# Patient Record
Sex: MALE | Race: WHITE | NOT HISPANIC OR LATINO | Employment: FULL TIME | URBAN - METROPOLITAN AREA
[De-identification: names, ages, dates, MRNs, and addresses within clinical notes are randomized per-mention and may not be internally consistent; named-entity substitution may affect disease eponyms.]

---

## 2019-06-19 ENCOUNTER — TELEPHONE (OUTPATIENT)
Dept: TRANSPLANT | Facility: CLINIC | Age: 44
End: 2019-06-19

## 2019-06-19 DIAGNOSIS — Z00.5 TRANSPLANT DONOR EVALUATION: ICD-10-CM

## 2019-06-19 NOTE — TELEPHONE ENCOUNTER
"[facility] BREEZE  [id number]      LIVING KIDNEY DONOR EVALUATION  Donor First Name Stefan Donor N    Donor Last Name Miguel Completed 2019 9:15 PM    1975 Record ID [id number]   BREEZE Screen PASSED     Intended Recipient  Recipient First Name Elan Recipient MRN    Recipient Last Name Radames chappell Relationship Not related (Other)   Recipient  1959 Recipient Diagnosis    Recipient's ABO      Donor Information  Age 44 Gender Male   Ht 178 cm (5' 10'') Race    Wt 106.5 kg (235 lbs) Ethnicity Not /   BMI 33.70 kg/m  Preferred Language English      Required No     Blood Type O   Demographics  Home Address [address] [place] [address] # [phone]   [facility] Type [facility] #    [facility] Code [address] Type    Country [place] Preferred Contact day Mon   Email [email or web address] Preferred Contact time 1:00 PM-4:00 PM   &&   Donor's Medical Information  Medical History Low Back Pain   Positive TB Skin Test   Post-Traumatic Stress Disorder (PTSD) Medications None Reported   Surgical History Arthrodesis, Left Ankle   Fracture Repair, Left Ankle, NOS   Total Knee Arthroplasty, Right Allergies NKDA   Social History EtOH: Rare (1-2 drinks/year)   Illicit Drug Use: Current: Marijuana   Tobacco: Current (1/2 ppd x 6 years) Self-Reported Functional Status \"I am able to participate in moderate recreational activities like golf, double tennis, dancing, throwing a baseball or football\"   Family Medical History Cancer (denies)   Diabetes (denies)   Heart Disease (denies)   Hypertension (denies)   Kidney Disease (denies)   Kidney Stones (denies) Exercise Frequency Exercise (Not on a regular basis)   Review of Organ Systems  Review of Systems Airway or Lungs: Yes   Blood Disorder: No   Cancer: No   Diabetes,Thyroid,Adrenal,Endocrine Disorder: No   Digestive or Liver: No   Heart or Circulatory System: No   Immune Diseases: No   Kidneys and " Bladder: No   Male Health: No   Muscles,Bones,Joints: Yes   Neuro: No   Psych: Yes   &&   Donor's Social Information  Marital Status Single Living Accommodation Living in temporary housing   Level of 's or technical degree complete Living Arrangement With parents/guardian   Employment Status Unemployed Concerns: health and life insurance No   Employer  Concerns: job security and lost income No   Occupation      Medical Insurance Status Has medical insurance     High Risk Behavior  High Risk Behaviors Blood transfusion < 12 months. (NO)   Commercial sex < 12 months. (NO)   Illicit IV drug use < 5yrs. (NO)   Male:male sexual contact < 5yrs. (NO)   Other high risk sexual contact < 12 months. (NO)   Reason for Donation  Referral Other ([employer]) Reason for Donation just want to help   Permission to Disclose Inquiry Yes Patient Comments    Donor Motivation Level Highly motivated donor     PCP Contact  PCP Name [facility]   PCP [facility]   PCP Phone [phone]   Emergency Contact  First Name Mariana First Name    Last Name Axel Last Name    Phone # [phone] Phone #    Phone Type Mobile Phone Type    Relationship Mother Relationship    Office Use  Reviewed By    Reviewed 6/19/2019 11:24 AM   Admin Folder Archive   Comments    Lost for Followup    Extended Comments    BREEZE ID [email or web address]:[id number] survey status completed   Activity History  Call  Task    Due Date 6/17/2019   Last Modified Date/Time 6/17/2019 1:24 PM   Comments
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
## 2019-06-26 DIAGNOSIS — Z00.5 TRANSPLANT DONOR EVALUATION: ICD-10-CM

## 2019-06-26 LAB
ABO + RH BLD: NORMAL
ABO + RH BLD: NORMAL
ALBUMIN UR-MCNC: NEGATIVE MG/DL
APPEARANCE UR: CLEAR
BACTERIA #/AREA URNS HPF: ABNORMAL /HPF
BILIRUB UR QL STRIP: ABNORMAL
COLOR UR AUTO: YELLOW
CREAT SERPL-MCNC: 1.28 MG/DL (ref 0.66–1.25)
CREAT UR-MCNC: 392 MG/DL
GFR SERPL CREATININE-BSD FRML MDRD: 67 ML/MIN/{1.73_M2}
GLUCOSE SERPL-MCNC: 94 MG/DL (ref 70–99)
GLUCOSE UR STRIP-MCNC: NEGATIVE MG/DL
HGB BLD-MCNC: 16.3 G/DL (ref 13.3–17.7)
HGB UR QL STRIP: ABNORMAL
KETONES UR STRIP-MCNC: NEGATIVE MG/DL
LEUKOCYTE ESTERASE UR QL STRIP: NEGATIVE
MICROALBUMIN UR-MCNC: 10 MG/L
MICROALBUMIN/CREAT UR: 2.63 MG/G CR (ref 0–17)
MUCOUS THREADS #/AREA URNS LPF: PRESENT /LPF
NITRATE UR QL: NEGATIVE
PH UR STRIP: 5 PH (ref 5–7)
PROT UR-MCNC: 0.18 G/L
PROT/CREAT 24H UR: 0.05 G/G CR (ref 0–0.2)
RBC #/AREA URNS AUTO: ABNORMAL /HPF
SOURCE: ABNORMAL
SP GR UR STRIP: >1.03 (ref 1–1.03)
SPECIMEN EXP DATE BLD: NORMAL
UROBILINOGEN UR STRIP-ACNC: 0.2 EU/DL (ref 0.2–1)
WBC #/AREA URNS AUTO: ABNORMAL /HPF

## 2019-06-26 PROCEDURE — 82565 ASSAY OF CREATININE: CPT | Performed by: TRANSPLANT SURGERY

## 2019-06-26 PROCEDURE — 86900 BLOOD TYPING SEROLOGIC ABO: CPT | Performed by: TRANSPLANT SURGERY

## 2019-06-26 PROCEDURE — 82043 UR ALBUMIN QUANTITATIVE: CPT | Performed by: TRANSPLANT SURGERY

## 2019-06-26 PROCEDURE — 36415 COLL VENOUS BLD VENIPUNCTURE: CPT | Performed by: TRANSPLANT SURGERY

## 2019-06-26 PROCEDURE — 82947 ASSAY GLUCOSE BLOOD QUANT: CPT | Performed by: TRANSPLANT SURGERY

## 2019-06-26 PROCEDURE — 84156 ASSAY OF PROTEIN URINE: CPT | Performed by: TRANSPLANT SURGERY

## 2019-06-26 PROCEDURE — 85018 HEMOGLOBIN: CPT | Performed by: TRANSPLANT SURGERY

## 2019-06-26 PROCEDURE — 81001 URINALYSIS AUTO W/SCOPE: CPT | Performed by: TRANSPLANT SURGERY

## 2019-07-01 ENCOUNTER — ALLIED HEALTH/NURSE VISIT (OUTPATIENT)
Dept: NURSING | Facility: CLINIC | Age: 44
End: 2019-07-01
Payer: COMMERCIAL

## 2019-07-01 VITALS
DIASTOLIC BLOOD PRESSURE: 82 MMHG | BODY MASS INDEX: 32.44 KG/M2 | HEIGHT: 69 IN | WEIGHT: 219 LBS | SYSTOLIC BLOOD PRESSURE: 110 MMHG

## 2019-07-01 DIAGNOSIS — Z01.818 KIDNEY LIVING DONOR EVALUATION, PRE-OP: Primary | ICD-10-CM

## 2019-07-01 PROCEDURE — 99207 ZZC NO CHARGE NURSE ONLY: CPT

## 2019-07-01 ASSESSMENT — MIFFLIN-ST. JEOR: SCORE: 1860.88

## 2019-07-01 NOTE — PROGRESS NOTES
I recorded a height, weight and three blood pressures 15 minutes apart.  I validated with the patient they have already had their lab appointment, have one today or one in the future. Ember Junior

## 2019-07-01 NOTE — LETTER
[facility]  [address] [place] [address]  [address]  [facility] [address]  Phone: [phone]  Fax: [phone]    July 1, 2019        Stefan Rivera  [address]   [facility] [address]          To whom it may concern:    RE: Stefan Rivera    Patient was seen at our clinic today.    Please contact us for questions or concerns.      Sincerely,        [facility]

## 2019-07-05 ENCOUNTER — TELEPHONE (OUTPATIENT)
Dept: TRANSPLANT | Facility: CLINIC | Age: 44
End: 2019-07-05

## 2019-07-05 NOTE — TELEPHONE ENCOUNTER
Informed Stefan that his Phase 1's are OK. Average GFR=83. Will now as SW to call for screening--available any afternoon for screening.

## 2019-07-09 ENCOUNTER — TELEPHONE (OUTPATIENT)
Dept: TRANSPLANT | Facility: CLINIC | Age: 44
End: 2019-07-09

## 2019-07-09 NOTE — TELEPHONE ENCOUNTER
I called and spoke briefly with Mr. Rivera.  He was sleeping when I called at 9:30 AM.  He asked that I call back at 2 PM.  He had expressed interest in being a kidney donor for a person who he learned about through a local TV news story.  Mr. Rivera is unemployed and has PTSD per his health history questionnaire.  I have been asked to complete an initial psychosocial telephone screening prior to him completing any more testing.      ROSIE Adorno, [facility]  Clinical  and Independent Donor Advocate  [facility]  Pager:  [phone]  Direct:  [phone]

## 2019-07-16 ENCOUNTER — TELEPHONE (OUTPATIENT)
Dept: TRANSPLANT | Facility: CLINIC | Age: 44
End: 2019-07-16
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
## 2021-05-29 ENCOUNTER — RECORDS - HEALTHEAST (OUTPATIENT)
Dept: ADMINISTRATIVE | Facility: CLINIC | Age: 46
End: 2021-05-29

## 2021-10-18 ENCOUNTER — HOSPITAL ENCOUNTER (EMERGENCY)
Facility: CLINIC | Age: 46
Discharge: HOME | End: 2021-10-18
Attending: EMERGENCY MEDICINE | Admitting: EMERGENCY MEDICINE
Payer: COMMERCIAL

## 2021-10-18 VITALS
DIASTOLIC BLOOD PRESSURE: 87 MMHG | TEMPERATURE: 98.2 F | HEIGHT: 71 IN | HEART RATE: 69 BPM | OXYGEN SATURATION: 98 % | WEIGHT: 219.9 LBS | RESPIRATION RATE: 16 BRPM | SYSTOLIC BLOOD PRESSURE: 132 MMHG | BODY MASS INDEX: 30.78 KG/M2

## 2021-10-18 DIAGNOSIS — M70.22 OLECRANON BURSITIS OF LEFT ELBOW: ICD-10-CM

## 2021-10-18 LAB
HOLD SPECIMEN: NORMAL

## 2021-10-18 PROCEDURE — 99283 EMERGENCY DEPT VISIT LOW MDM: CPT | Performed by: EMERGENCY MEDICINE

## 2021-10-18 ASSESSMENT — MIFFLIN-ST. JEOR: SCORE: 1894.59

## 2021-10-18 ASSESSMENT — ENCOUNTER SYMPTOMS: CONSTITUTIONAL NEGATIVE: 1

## 2021-10-18 NOTE — ED PROVIDER NOTES
"ED Provider Note  [facility]      History     Chief Complaint   Patient presents with     Joint Swelling     left elbow     HPI  Stefan Rivera is a 46 year old male who works as a  and sometimes sets his elbows down on a work surface.  He presents now with swelling there is focal over the elbow has the appearance of olecranon bursitis.  He had no gross trauma there is no erythema or excessive warmth no systemic symptoms.  Pain is been 4 about a month when he is stressing the elbow the swelling was just noted in the last couple of days.    Past Medical History  Past Medical History:   Diagnosis Date     Chronic pain      Past Surgical History:   Procedure Laterality Date     ORTHOPEDIC SURGERY      knee replacement, right knee     No current outpatient medications on file.    No Known Allergies  Family History  History reviewed. No pertinent family history.  Social History   Social History     Tobacco Use     Smoking status: Current Every Day Smoker     Packs/day: 0.50     Smokeless tobacco: Never Used   Substance Use Topics     Alcohol use: No     Drug use: Yes     Types: Marijuana     Comment: marijuana daily      Past medical history, past surgical history, medications, allergies, family history, and social history were reviewed with the patient. No additional pertinent items.       Review of Systems   Constitutional: Negative.    Musculoskeletal:        Swelling left olecranon bursa   All other systems reviewed and are negative.    A complete review of systems was performed with pertinent positives and negatives noted in the HPI, and all other systems negative.    Physical Exam   BP: 119/88  Pulse: 81  Temp: 98.2  F (36.8  C)  Resp: 16  Height: 180.3 cm (5' 11\")  Weight: 99.7 kg (219 lb 14.4 oz)  SpO2: 100 %  Physical Exam  Vitals and nursing note reviewed.   Constitutional:       General: He is not in acute distress.  Musculoskeletal:      Left elbow: Swelling present. " Tenderness present.      Comments: Swollen left olecranon bursa consistent with bursitis no erythema no bony tenderness pronation and supination is unimpeded   Neurological:      Mental Status: He is alert.         ED Course      Procedures            Results for orders placed or performed during the hospital encounter of 10/18/21   [place] Draw     Status: None (In process)    Narrative    The following orders were created for panel order [place] Draw.  Procedure                               Abnormality         Status                     ---------                               -----------         ------                     Extra Blue Top Tube[[id number]]                              In process                 Extra Red Top Tube[[id number]]                               In process                 Extra Green Top (Lithium...[[id number]]                      In process                 Extra Purple Top Tube[[id number]]                            In process                   Please view results for these tests on the individual orders.     Medications - No data to display     Assessments & Plan (with Medical Decision Making)   Simple left olecranon bursitis no evidence for infection recommend Ace wrap ibuprofen ice and wear a sling to reduce movement when possible.  There is no history of trauma and there is no erythema to suggest infection.    I have reviewed the nursing notes. I have reviewed the findings, diagnosis, plan and need for follow up with the patient.    New Prescriptions    No medications on file       Final diagnoses:   Olecranon bursitis of left elbow       --    [facility] EMERGENCY DEPARTMENT  10/18/2021     Tank Issa MD  10/18/21 [id number]

## 2021-10-18 NOTE — DISCHARGE INSTRUCTIONS
Ace wrap and sling for comfort  Ibuprofen ice for pain and swelling  It usually goes away in about a month

## 2021-10-18 NOTE — ED TRIAGE NOTES
"Presents with left elbow swelling. Patient reports this has been going on \"for awhile\", worsening within the past month. Patient denies trauma to the elbow. States he is a  and uses his left arm constantly. Patient denies hx of gout.  "

## 2023-05-22 ENCOUNTER — TELEPHONE (OUTPATIENT)
Dept: TRANSPLANT | Facility: CLINIC | Age: 48
End: 2023-05-22

## 2023-05-22 ENCOUNTER — DOCUMENTATION ONLY (OUTPATIENT)
Dept: TRANSPLANT | Facility: CLINIC | Age: 48
End: 2023-05-22

## 2023-05-22 NOTE — TELEPHONE ENCOUNTER
"Donor Intake Start:23Donor Intake Complete:23  Expiration Date:23  Gender:MalePreferred Language:English  Full Name:Stefan Enrique  Needed:[not answered]  Phone Number:[phone] Phone:  Contact Preference:[not answered]Best Contact Time:10am - 5pm  Emergency Contact:Quintin Contact #:[phone]  Relationship to Contact:Contact is my parent  :[birth year]Age:48  Country:[place]  Address:[facility]City:[place]  State:[id number]  Height:5'10\"Weight:230lbs  BMI:33  Employment Status:EmployedHas PTO for donation?No, not reimbursed  Occupation:detailerRequires Heavy Lifting?  No  Education Level:[employer] Or AssocMarital Status:Single  Exercise Routine:OccasionalHealth Insurance:  Yes  Blood Type:OEthnicity/Race:Multi-Racial  Donor Type:Standard Voucher Donor  Prefer Remote Donation:[not answered]  Physician:n/a  n/a, [place]  Motivation to donate:  vance is a close friend of mine i have know him for 35 years  Living Donor Pre-Screening  Is In [place]  Yes  Will Accept Blood Transfusions?  Yes  Has been Diagnosed with Kidney Disease?  No  Has had a Heart Attack?  No  Has Diabetes?  No  Has had Cancer?  No  Has had Kidney Stones?  No  Has Used Tobacco  Yes    - Currently uses Tobacco?  Yes    - Will Stop for Surgery?  Yes    - How Many Years:10    - Tobacco use (packs/cans) / Frequency:1 / Daily  Has HIV?  No  Is Currently Incarcerated?  No  Is Currently Residing in [place]  Yes  History Misc  Has Allergies?  No  Has had Surgeries?  Yes  Surgery When  right knee replacement   left ankle reconstruction   Takes Medication?  Yes  Medication Dose Frequency  ibprofen 400mg when needed  Medical History  History of High BP?  Never  Has History Of CABG (bypass surgery)?  No  History of Blood Clots?  Never  History of Coronary Disease?  Never  Has Stents Implanted?  No  Has History of Chest Pain with Exercise?  No  Has History of Chest " Pain at Other Times?  No  Results of Climbing 2 Flights of Stairs?Unknown  Has had Stress Test within Last Year?  No  Has had Stroke?  No  Has had Leg Bypass?  No  History of Lung Disease?  Never  History of COPD?  Never  History of TB?  Treated in past    - Is TB Active?[not answered]  History of Pneumonia?  Never  Has Respiratory Issues?  Unknown  Has Gastro Issues?  No  History of Gallstones?  Never  History of Pancreatitis?  Never  History of Liver Disease?  Never  History of Hepatitis B?  Never    - Is Hep B Active?[not answered]  History of Hepatitis C?  Never  History of Bleeding Problem?  Never  History of UTIs?  No  History of Kidney Damage?  Never  History of Proteinuria?  Never  History of Hematuria?  Never  History of Neuro Disease?  Never  History of Seizure?  Never  History of Lupus?  Never  History of Paralysis?  Never  History of Arthritis?  Never  History of Neuropathy?  Never  History of Depression?  Treated in past  History of Anxiety?  Never  History of Documented Psychiatric Illness?  Never  Has had Transfusions?  Unknown  History of Obesity?  No  History of Fabry's Disease?  No  History of Sickle Cell Disease?  No  History of Sickle Cell Trait?  No  History of Sarcoidosis?  No  History of Auto-Immune Disease  No  Has had Physical Exam?  Yes    - how many years ago:5  Has had PSA Test?  No  Has had Colonoscopy?  No  Medical History Comments?I have had a right knee replacement and left ankle reconstruction  Living Donor Family Medical History  Anyone with kidney disease?  Unknown  Anyone with liver disease?  Unknown  Anyone with heart disease?  Unknown  Anyone with coronary artery disease?  Unknown  Anyone with high blood pressure?  Unknown  Anyone with blood disorder?  Unknown  Anyone with cancer?  Yes    - which family members:father  Anyone with kidney cancer?  Unknown  Anyone with diabetes?  Unknown  Is mother alive?  Yes  Mother's age?74  Is father alive?  No  Father's age?69  Father's cause  of death?lung cancer  How many siblings?0  How many adult children?0  How many children under 18?3  Social History  Has Used Alcohol?  Yes    - currently uses alcohol:  No    - how much:1/Yearly  Has Abused Alcohol?  No  Has Used Drugs?  Yes  Drugs UsedLast UsedGot Treatment?  [id number] years  No     Has had legal issues w/ law enforcement?  No  Traveled over 100 miles from home in last year?  No  Has had suicidal thoughts or attempts in the last five years?  No

## 2023-05-30 ENCOUNTER — TELEPHONE (OUTPATIENT)
Dept: TRANSPLANT | Facility: CLINIC | Age: 48
End: 2023-05-30

## 2023-05-30 NOTE — TELEPHONE ENCOUNTER
Initial Independent Living Donor Advocate contact made with potential donor today.  I introduced myself and my role during the donation process, including:   CHRISTINE ROLE   The federal government requires that all licensed transplant centers provide the living donor with an Independent Living Donor Advocate (CHRISTINE).  I do not meet recipients or attend meetings that discuss their care or decision to transplant them. My role is separate to avoid any conflict of interest.  My role is to ensure:  1) your rights are protected;  2) you get all the information you need from the transplant team to make a fully informed decision whether to donate;   3) that living donation is in your best interest.   4) that you have the right to decide NOT to go forward with living donation at any time during this process.  I am available to you throughout the workup, during surgery phase and follow-up at home.     WORKUP & PRIVACY   Your identity and workup are not shared with the recipient at any time.   The recipient's insurance covers the medical expenses related to the donor evaluation and surgery.  However, it is important that you carry your own health insurance to address any medical issues that are found and are NOT related to living donation.  Additionally, age appropriate cancer screening (I.e. mammograms,  colonoscopies, etc) is required and would be through your insurance.  There is a psychosocial and medical donor workup that consists of testing to determine if you are healthy enough to donate. Workup tests include tissue typing/genetics, many blood draws, urine collection/ (kidney function testing), chest x-ray, EKG/other heart testing, CT scan. Age appropriate cancer screening is required and would be through your insurance. As you complete each step then you may move on to the next.  Workup can take as little or as long as you need and you can stop the process at any time. Transplant is a treatment option, not a cure. A kidney  from a living kidney donor can last 12-14 years.  Other treatment options are  donation and two types of dialysis.   This is major surgery and your estimated hospital stay is approximately 1-2 nights.  After surgery, there are driving and lifting restrictions - no driving for two weeks and no lifting over ten pounds for 8 weeks.  Donors are routinely off from work for 4 - 6 weeks after surgery, and potentially longer if they have a physical job.     If you anticipate lost wages due to donation, donor wage reimbursement options may be available to you and will be reviewed with you during the evaluation process. Donor Shield and NLDAC explained.  We reviewed the importance of completing follow-up labs and surveys at six months, 1 year and 2 years after donation to monitor kidney health and the impact donation has had on their life post donation.        QUESTIONS    Have you received the Welcome e-mail that includes copies of the informed consent, financial letter, information on donor shield and NLDAC from the transplant department? Yes.    Have you discussed with anyone your potential decision to donate?   Yes.    Is anyone pressuring or coercing you to donate? Yes.    Have you discussed any financial arrangements with recipient around donating a kidney? Yes.    Are you aware that you can confidentially opt out at any time, up to and including day of donation? Yes.    At this time, would you like to proceed with the medical evaluation to see if you can be a kidney donor?  Yes.    If yes, I will make an appointment for your donor coordinator to reach out to you with next steps.     Contact information for CHRISTINE's was provided Yes.    Meg Sanchez- [phone]  Dalila Allen- [phone]      Time frame for donation: open  Paired exchange was introduced  Yes.      MyChart was initiated  No.  CareEverywhere was initiated No.    CHRISTINE NOTES: Stefan wants to be a potential donor for one of two different friends.   His donor nurse coordinator is Ivonne Kaiser RN.  She is scheduled to call him on 6/6 at 2:30 pm      Duration of call 25 minutes

## 2023-06-06 ENCOUNTER — TELEPHONE (OUTPATIENT)
Dept: TRANSPLANT | Facility: CLINIC | Age: 48
End: 2023-06-06

## 2023-06-06 NOTE — TELEPHONE ENCOUNTER
Contacted Stefan Rivera to introduce myself and my role, review of medical/surgical/family history and next steps.     Stefan Rivera  is aware He can stop donor evaluation at any time.     Regular blood donor? No Last blood donation date N/A (Notified donor to avoid blood donation at this time to get accurate blood counts if going through evaluation)     Stefan Rivera is a 48 year old male ABO O that would like to learn more about donation a friend.     Concerns from medical/surgical/family history: chronic back pain but not on pain medications. Had some steroid injections in 2019. Will check with team to see if we are okay with him moving forward.     Current medications and NSAID use:   ibuprofen 400mg PRN    Legal issues w/ law enforcement: Was in nursing home but has been out for 11 years.

## 2023-06-07 ENCOUNTER — TELEPHONE (OUTPATIENT)
Dept: TRANSPLANT | Facility: CLINIC | Age: 48
End: 2023-06-07

## 2023-06-07 NOTE — TELEPHONE ENCOUNTER
After speaking with the team and how extensive his chronic pain as been we don't want him moving forward as a donor. We could make his back pain worse and/or cause other issues down the road. Thanked him for his time and offered other opportunities to help be a champion for his friend.

## 2024-02-01 ENCOUNTER — TELEPHONE (OUTPATIENT)
Dept: TRANSPLANT | Facility: CLINIC | Age: 49
End: 2024-02-01

## 2024-02-01 NOTE — TELEPHONE ENCOUNTER
I talked with Stefan.  He was pleasant.  He said he understood our reasoning for not accepting a donor with chronic back pain.  I also tabled to him about his creatinine from 2019 which was 1.2 and GFR of 78.  I let him know he most likely did not have enough kidney function   to spare a kidney.  I thanked him for coming forward

## 2024-02-01 NOTE — TELEPHONE ENCOUNTER
Stefan would like to be reconsidered for transplant. States yes he has chronic back pain with disc issues and will occ get injections depending on how severe but he hasn't been lately. Uses Ibuprofen occ and uses CBD/marijuana.Very much wants to be a donor. We discussed surgeons reluctancy to do surgery on someone with back issues and he understands. But wants to be reconsidered.Will ask coordinator to talk to him again.